# Patient Record
Sex: FEMALE | Race: WHITE | NOT HISPANIC OR LATINO
[De-identification: names, ages, dates, MRNs, and addresses within clinical notes are randomized per-mention and may not be internally consistent; named-entity substitution may affect disease eponyms.]

---

## 2020-12-15 ENCOUNTER — TRANSCRIPTION ENCOUNTER (OUTPATIENT)
Age: 25
End: 2020-12-15

## 2020-12-15 ENCOUNTER — OUTPATIENT (OUTPATIENT)
Dept: OUTPATIENT SERVICES | Facility: HOSPITAL | Age: 25
LOS: 1 days | End: 2020-12-15
Payer: COMMERCIAL

## 2020-12-15 DIAGNOSIS — Z20.828 CONTACT WITH AND (SUSPECTED) EXPOSURE TO OTHER VIRAL COMMUNICABLE DISEASES: ICD-10-CM

## 2020-12-15 LAB — SARS-COV-2 RNA SPEC QL NAA+PROBE: SIGNIFICANT CHANGE UP

## 2020-12-15 PROCEDURE — U0003: CPT

## 2020-12-16 DIAGNOSIS — Z20.828 CONTACT WITH AND (SUSPECTED) EXPOSURE TO OTHER VIRAL COMMUNICABLE DISEASES: ICD-10-CM

## 2022-08-23 ENCOUNTER — APPOINTMENT (OUTPATIENT)
Dept: PSYCHIATRY | Facility: CLINIC | Age: 27
End: 2022-08-23

## 2022-08-23 DIAGNOSIS — F32.A DEPRESSION, UNSPECIFIED: ICD-10-CM

## 2022-08-23 PROCEDURE — 99205 OFFICE O/P NEW HI 60 MIN: CPT

## 2022-09-21 ENCOUNTER — APPOINTMENT (OUTPATIENT)
Dept: PSYCHIATRY | Facility: CLINIC | Age: 27
End: 2022-09-21

## 2022-09-21 PROCEDURE — 99214 OFFICE O/P EST MOD 30 MIN: CPT | Mod: 95

## 2022-10-19 ENCOUNTER — APPOINTMENT (OUTPATIENT)
Dept: PSYCHIATRY | Facility: CLINIC | Age: 27
End: 2022-10-19

## 2022-10-19 PROCEDURE — 99214 OFFICE O/P EST MOD 30 MIN: CPT | Mod: 95

## 2022-10-19 RX ORDER — ESCITALOPRAM OXALATE 5 MG/1
5 TABLET ORAL DAILY
Qty: 30 | Refills: 0 | Status: COMPLETED | COMMUNITY
Start: 2022-09-21 | End: 2022-10-19
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
## 2022-10-19 NOTE — CURRENT PSYCHIATRIC SYMPTOMS
[Depressed Mood] : depressed mood [Anhedonia] : anhedonia [Guilt] : feeling guilty [Decreased Concentration] : decreased concentrating ability [Insomnia] : insomnia [Excessive Worry] : excessive worry [Ruminations] : ruminations [Re-experiencing] : re-experiencing [Hypochondriasis] : hypochondriasis [Panic] : panic  [de-identified] : denies [de-identified] : denies [de-identified] : denies [de-identified] : denies [de-identified] : denies

## 2022-10-19 NOTE — PAST MEDICAL HISTORY
[FreeTextEntry1] : H/O: depression and anxiety\par Inpatient hospitalization: denies \par Past SI: denies\par Therapist: [facility]. Started May 31, 2022. Didn't go for 3 weeks. \par Psychiatrist: denies\par Medication trials: denies\par Current medications: Lexapro 10 mg since June 30, 2022\par Firearms: denies \par Medical: denies\par
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
## 2022-10-19 NOTE — DISCUSSION/SUMMARY
[FreeTextEntry1] : Assessment: Patient is a 27 yo female with h/o depression and anxiety seen today for medication management. Patient is compliant with his medications, tolerating it well without any side effects. I-STOP was checked without any problems.\par \par PLAN:\par Continue Wellbutrin 75 mg PO QAM for depression, low motivation, energy, concentration and decrease SSRI induced sexual dysfunction.\par Increase Lexapro 15 to 20 mg PO QAM for depression and anxiety\par - Discussed risks and benefits of medications including side effects of GI and sexual with SSRI. Alternative strategies including no intervention discussed with patient. Patient consents to current medications as prescribed.\par - Discussed with patient regarding importance of abstinence and sobriety from alcohol and drugs. Educated about relationship between worsening mood/anxiety symptoms and drug use and improvement of symptoms with abstinence. \par - Discussed about unpredictable effects including cardiorespiratory collapse from the combination of illicit drugs and prescribed medications. Patient verbalized understanding.\par - Patient understands to contact clinic prn with concerns and agrees to call 911 or go to nearest ER if symptoms worsen.\par - Next appointment made in 1 month. Patient left the office without any distress.\par \par \par \par

## 2022-10-19 NOTE — PHYSICAL EXAM
[None] : none [Anxious] : anxious [Dysphoric] : dysphoric [Normal] : alert, oriented to person, place, and time [Fair] : fair [FreeTextEntry8] : "I'm depressed."

## 2022-11-30 ENCOUNTER — APPOINTMENT (OUTPATIENT)
Dept: PSYCHIATRY | Facility: CLINIC | Age: 27
End: 2022-11-30

## 2022-11-30 PROCEDURE — 99214 OFFICE O/P EST MOD 30 MIN: CPT | Mod: 95

## 2022-11-30 RX ORDER — BUPROPION HYDROCHLORIDE 75 MG/1
75 TABLET, FILM COATED ORAL
Qty: 30 | Refills: 0 | Status: COMPLETED | COMMUNITY
Start: 2022-08-23 | End: 2022-11-30

## 2022-11-30 NOTE — DISCUSSION/SUMMARY
[FreeTextEntry1] : Assessment: Patient is a 25 yo female with h/o depression and anxiety seen today for medication management. Patient is compliant with his medications, tolerating it well without any side effects. I-STOP was checked without any problems.\par \par PLAN:\par Increase Wellbutrin 75 to  mg PO QAM for depression, low motivation, energy, concentration and decrease SSRI induced sexual dysfunction.\par Continue Lexapro 20 mg PO QAM for depression and anxiety\par - Discussed risks and benefits of medications including side effects of GI and sexual with SSRI. Alternative strategies including no intervention discussed with patient. Patient consents to current medications as prescribed.\par - Discussed with patient regarding importance of abstinence and sobriety from alcohol and drugs. Educated about relationship between worsening mood/anxiety symptoms and drug use and improvement of symptoms with abstinence. \par - Discussed about unpredictable effects including cardiorespiratory collapse from the combination of illicit drugs and prescribed medications. Patient verbalized understanding.\par - Patient understands to contact clinic prn with concerns and agrees to call 911 or go to nearest ER if symptoms worsen.\par - Next appointment made in 1 month. Patient left the office without any distress.\par \par \par \par
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
## 2022-11-30 NOTE — PHYSICAL EXAM
[None] : none [Anxious] : anxious [Dysphoric] : dysphoric [Normal] : alert, oriented to person, place, and time [Fair] : fair [FreeTextEntry8] : "I'm depressed." better [FreeTextEntry9] : better
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
## 2022-12-30 ENCOUNTER — APPOINTMENT (OUTPATIENT)
Dept: PSYCHIATRY | Facility: CLINIC | Age: 27
End: 2022-12-30
Payer: COMMERCIAL

## 2022-12-30 PROCEDURE — 99214 OFFICE O/P EST MOD 30 MIN: CPT | Mod: 95

## 2022-12-30 NOTE — DISCUSSION/SUMMARY
[FreeTextEntry1] : Assessment: Patient is a 27 yo female with h/o depression and anxiety seen today for medication management. Patient is compliant with his medications, tolerating it well without any side effects. I-STOP was checked without any problems.\par \par PLAN:\par Increase Wellbutrin  to 300 mg PO QAM for depression, low motivation, energy, concentration and decrease SSRI induced sexual dysfunction.\par Continue Lexapro 20 mg PO QAM for depression and anxiety\par - Discussed risks and benefits of medications including side effects of GI and sexual with SSRI. Alternative strategies including no intervention discussed with patient. Patient consents to current medications as prescribed.\par - Discussed with patient regarding importance of abstinence and sobriety from alcohol and drugs. Educated about relationship between worsening mood/anxiety symptoms and drug use and improvement of symptoms with abstinence. \par - Discussed about unpredictable effects including cardiorespiratory collapse from the combination of illicit drugs and prescribed medications. Patient verbalized understanding.\par - Patient understands to contact clinic prn with concerns and agrees to call 911 or go to nearest ER if symptoms worsen.\par - Next appointment made in 1 month. Patient left the office without any distress.\par \par \par \par

## 2022-12-30 NOTE — HISTORY OF PRESENT ILLNESS
[FreeTextEntry1] : Last month Wellbutrin 75 was increased to  mg. States she likes the medication. Concentration is better. Has trouble getting out of bed but states she is working with her therapist. Lisbet Lyles ([email or web address]) Phone number is . States seeing and talking to her  has been helping her a lot. \par \par Mood: more calmer, less depression. Less irritability but still there. \par Sleep: 6-8 hours of sleep. Takes Melatonin PRN \par Appetite: lesser. "I don’t have to eat as much at lunch now."\par Energy is decreased. Trouble getting out of bed\par Concentration better \par Motivation is decreased. Denies any AVH, SI or HI. \par   [Verbal consent obtained from patient] : the patient, [unfilled]

## 2022-12-30 NOTE — CURRENT PSYCHIATRIC SYMPTOMS
[Depressed Mood] : depressed mood [Anhedonia] : anhedonia [Guilt] : feeling guilty [Decreased Concentration] : decreased concentrating ability [Insomnia] : insomnia [Excessive Worry] : excessive worry [Ruminations] : ruminations [Re-experiencing] : re-experiencing [Hypochondriasis] : hypochondriasis [Panic] : panic  [de-identified] : denies [de-identified] : denies [de-identified] : denies [de-identified] : denies [de-identified] : denies
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
## 2023-01-30 ENCOUNTER — APPOINTMENT (OUTPATIENT)
Dept: PSYCHIATRY | Facility: CLINIC | Age: 28
End: 2023-01-30
Payer: COMMERCIAL

## 2023-01-30 PROCEDURE — 99214 OFFICE O/P EST MOD 30 MIN: CPT | Mod: 95

## 2023-01-30 NOTE — DISCUSSION/SUMMARY
[FreeTextEntry1] : Assessment: Patient is a 25 yo female with h/o depression and anxiety seen today for medication management. Patient is compliant with his medications, tolerating it well without any side effects. I-STOP was checked without any problems.\par \par PLAN:\par Continue Wellbutrin  mg PO QAM for depression, low motivation, energy, concentration and decrease SSRI induced sexual dysfunction.\par Continue Lexapro 20 mg PO QAM for depression and anxiety\par - Discussed risks and benefits of medications including side effects of GI and sexual with SSRI. Alternative strategies including no intervention discussed with patient. Patient consents to current medications as prescribed.\par - Patient understands to contact clinic prn with concerns and agrees to call 911 or go to nearest ER if symptoms worsen.\par - Next appointment made in 3 month. Patient left the office without any distress.\par \par \par \par

## 2023-01-30 NOTE — HISTORY OF PRESENT ILLNESS
[FreeTextEntry1] : Last month Wellbutrin 150 was increased to 300 mg. States the first 2 weeks of the medications was good. She felt uplifting, less depression, more energy. 2 weeks ago a friend of hers passed away by suicide. States she didn't know him that well but she was in shock. Took 5.5 days off from work. \par Mood is depressed but situational. Feeling her emotions. Talking to her therapist which helps. \par Sleeping is increased which is also situational. \par Appetite is good\par Energy, concentration and motivation are al decreased which is situational. \par Therapist. Lisbet Lyles ([email or web address]) Phone number is .   [Verbal consent obtained from patient] : the patient, [unfilled]
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
## 2023-03-07 NOTE — DISCUSSION/SUMMARY
[FreeTextEntry1] : Spoke to the patient and heard from her therapist. States patient is not able to focus, has low motivation, low energy, depressed. Denies any SI. Compliant with the Lexapro 20 and Wellbutrin 300 mg. \par \par As per therpiast:\par "Patient is currently experiencing depressive symptoms for the past 9 days—including low motivation, lack of sleep/oversleeping, lack of energy, low appetite, confusion/inability to concentrate, poor memory, lethargy, and irritability. Tamanna explained that these 9 days of feeling down happened right after a week of feeling productive. We are concerned that perhaps the increased dose of Wellbutrin at the end of January might have triggered an episode, and wanted to see if we could set up an appointment for her to speak with you ASAP to further discuss this. Please let me know if there is anything that I can do to assist in this process. Thank you!"\par \par PLAN\par Lowered the dose of the Wellbutrin from 300 to  mg. \par Will try Abilify or Seroquel for depression augmentation/mood disorder\par Next appt to see writer is on [phone]. \par \par

## 2023-03-17 ENCOUNTER — APPOINTMENT (OUTPATIENT)
Dept: PSYCHIATRY | Facility: CLINIC | Age: 28
End: 2023-03-17
Payer: COMMERCIAL

## 2023-03-17 ENCOUNTER — APPOINTMENT (OUTPATIENT)
Dept: PSYCHIATRY | Facility: CLINIC | Age: 28
End: 2023-03-17

## 2023-03-17 PROCEDURE — 99214 OFFICE O/P EST MOD 30 MIN: CPT | Mod: 95

## 2023-03-17 NOTE — DISCUSSION/SUMMARY
[FreeTextEntry1] : Assessment: Patient is a 25 yo female with h/o depression and anxiety seen today for medication management. Patient is compliant with his medications, tolerating it well without any side effects. I-STOP was checked without any problems.\par \par PLAN:\par Start Seroquel 25 mg PO QHS for depression augmentation, and insomnia\par Decrease Wellbutrin  to 150 mg PO QAM for depression, low motivation, energy, concentration and decrease SSRI induced sexual dysfunction.\par Continue Lexapro 20 mg PO QAM for depression and anxiety\par - Discussed risks and benefits of medications including side effects of GI and sexual with SSRI. Alternative strategies including no intervention discussed with patient. Patient consents to current medications as prescribed.\par - Patient understands to contact clinic prn with concerns and agrees to call 911 or go to nearest ER if symptoms worsen.\par - Next appointment made in 1 month. Patient left the office without any distress.\par \par \par \par

## 2023-03-17 NOTE — HISTORY OF PRESENT ILLNESS
[FreeTextEntry1] : Patient is here for 1 month followup visit via telehealth\par \par On 3/7 spoke to the patient and heard from her therapist. States patient is not able to focus, has low motivation, low energy, depressed. Denies any SI. Compliant with the Lexapro 20 and Wellbutrin 300 mg. \par \par As per therapist:\par "Patient is currently experiencing depressive symptoms for the past 9 days—including low motivation, lack of sleep/oversleeping, lack of energy, low appetite, confusion/inability to concentrate, poor memory, lethargy, and irritability. Tamanna explained that these 9 days of feeling down happened right after a week of feeling productive. We are concerned that perhaps the increased dose of Wellbutrin at the end of January might have triggered an episode, and wanted to see if we could set up an appointment for her to speak with you ASAP to further discuss this. Please let me know if there is anything that I can do to assist in this process. Thank you!"\par \par PLAN\par Lowered the dose of the Wellbutrin from 300 to  mg. \par Will try Abilify or Seroquel for depression augmentation/mood disorder\par Next appt to see writer is on [phone]. \par \par \par Today patient is on Day 9 of Wellbutrin  mg. \par \par Mood: States she still has depression. States she has this hangover like feeling from the morning till the late afternoon. Has headache, ad decrease focus. States she si reducing her job from FT to PT and states will need a note from the writer for that but states she will tell me when. \par Sleep: fluctuates. Sun-Tuesday increase sleep Last couple of nights she had decreased sleep. \par Appetite is low eating 1/3 meals per day\par Energy, concentration and motivation are all decreased. \par Therapist. Lisbet Lyles ([email or web address]) Phone number is .   [Verbal consent obtained from patient] : the patient, [unfilled]
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
## 2023-04-17 ENCOUNTER — APPOINTMENT (OUTPATIENT)
Dept: PSYCHIATRY | Facility: CLINIC | Age: 28
End: 2023-04-17
Payer: COMMERCIAL

## 2023-04-17 PROCEDURE — 99214 OFFICE O/P EST MOD 30 MIN: CPT | Mod: 95

## 2023-04-17 NOTE — HISTORY OF PRESENT ILLNESS
[FreeTextEntry1] : Patient is here for 1 month followup visit via telehealth\par \par Last month Seroquel 25 mg on the patient. States she likes the medication. States she does feel groggy in the morning but likes the fact she si sleeping so great on it. Last month also Wellbutrin was decreased from  to  mg as it was affecting her sleep. \par \par Mood: better. Less depression. Went back to work FT. \par Sleep: 7-8 hours\par Appetite better\par Energy, concentration and motivation are all better. \par Therapist. Lisbet Lyles ([email or web address]) Phone number is .   [Verbal consent obtained from patient] : the patient, [unfilled]
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
## 2023-04-17 NOTE — DISCUSSION/SUMMARY
[FreeTextEntry1] : Assessment: Patient is a 27 yo female with h/o depression and anxiety seen today for medication management. Patient is compliant with his medications, tolerating it well without any side effects. I-STOP was checked without any problems.\par \par PLAN:\par Continue Seroquel 25 mg PO QHS for depression augmentation, and insomnia\par Continue Wellbutrin  mg PO QAM for depression, low motivation, energy, concentration and decrease SSRI induced sexual dysfunction.\par Continue Lexapro 20 mg PO QAM for depression and anxiety\par - Discussed risks and benefits of medications including side effects of GI and sexual with SSRI. Alternative strategies including no intervention discussed with patient. Patient consents to current medications as prescribed.\par - Patient understands to contact clinic prn with concerns and agrees to call 911 or go to nearest ER if symptoms worsen.\par - Next appointment made in 1 month. Patient left the office without any distress.\par \par \par \par

## 2023-05-25 ENCOUNTER — APPOINTMENT (OUTPATIENT)
Dept: PSYCHIATRY | Facility: CLINIC | Age: 28
End: 2023-05-25
Payer: COMMERCIAL

## 2023-05-25 PROCEDURE — 99214 OFFICE O/P EST MOD 30 MIN: CPT | Mod: 95
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
## 2023-05-25 NOTE — DISCUSSION/SUMMARY
[FreeTextEntry1] : Assessment: Patient is a 27 yo female with h/o depression and anxiety seen today for medication management. Patient is compliant with his medications, tolerating it well without any side effects. I-STOP was checked without any problems.\par \par PLAN:\par Continue Seroquel 25 mg PO QHS for depression augmentation, and insomnia\par Continue Wellbutrin  mg PO QAM for depression, low motivation, energy, concentration and decrease SSRI induced sexual dysfunction.\par Continue Lexapro 20 mg PO QAM for depression and anxiety\par - Discussed risks and benefits of medications including side effects of GI and sexual with SSRI. Alternative strategies including no intervention discussed with patient. Patient consents to current medications as prescribed.\par - Patient understands to contact clinic prn with concerns and agrees to call 911 or go to nearest ER if symptoms worsen.\par - Next appointment made in 3 month. Patient left the office without any distress.\par \par \par \par 
Patent

## 2023-05-25 NOTE — PHYSICAL EXAM
[None] : none [Anxious] : anxious [Dysphoric] : dysphoric [Normal] : alert, oriented to person, place, and time [Fair] : fair [FreeTextEntry9] : better [FreeTextEntry8] : "I'm depressed." better

## 2023-08-15 ENCOUNTER — APPOINTMENT (OUTPATIENT)
Dept: PSYCHIATRY | Facility: CLINIC | Age: 28
End: 2023-08-15
Payer: SELF-PAY

## 2023-08-15 PROCEDURE — 99214 OFFICE O/P EST MOD 30 MIN: CPT | Mod: 95

## 2023-08-15 RX ORDER — BUPROPION HYDROCHLORIDE 150 MG/1
150 TABLET, EXTENDED RELEASE ORAL
Qty: 90 | Refills: 1 | Status: ACTIVE | COMMUNITY
Start: 2022-11-30 | End: 1900-01-01

## 2023-08-15 RX ORDER — ESCITALOPRAM OXALATE 20 MG/1
20 TABLET ORAL DAILY
Qty: 90 | Refills: 1 | Status: ACTIVE | COMMUNITY
Start: 2022-08-23 | End: 1900-01-01
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
## 2023-08-15 NOTE — DISCUSSION/SUMMARY
[FreeTextEntry1] : Assessment: Patient is a 27 yo female with h/o depression and anxiety seen today for medication management. Patient is compliant with his medications, tolerating it well without any side effects. I-STOP was checked without any problems.  PLAN: Continue Seroquel 25 mg PO QHS for depression augmentation, and insomnia Continue Wellbutrin  mg PO QAM for depression, low motivation, energy, concentration and decrease SSRI induced sexual dysfunction. Continue Lexapro 20 mg PO QAM for depression and anxiety - Discussed risks and benefits of medications including side effects of GI and sexual with SSRI. Alternative strategies including no intervention discussed with patient. Patient consents to current medications as prescribed. - Patient understands to contact clinic prn with concerns and agrees to call 911 or go to nearest ER if symptoms worsen. - Next appointment made in 6 month. Patient left the office without any distress.

## 2023-08-15 NOTE — HISTORY OF PRESENT ILLNESS
[FreeTextEntry1] : The following service was provided using telehealth between writer/provider and patient. The patient was at home. The writer was at clinic. Patient was alone and consented to telehealth format. All treatment plans through and including today's date were reviewed with the patient.  Patient is here for 3-month follow-up visit via telehealth.  No medication changes at that time. States she is doing very well.   Mood: better. Less depression and less anxiety. 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  Sleep: 7-8 hours. Taking Seroquel 12.5 mg Appetite better Energy, concentration and motivation are all better.  Therapist. Lisbet Lyles ([email or web address]) Phone number is .   [Verbal consent obtained from patient] : the patient, [unfilled]

## 2023-11-08 ENCOUNTER — APPOINTMENT (OUTPATIENT)
Dept: PSYCHIATRY | Facility: CLINIC | Age: 28
End: 2023-11-08
Payer: SELF-PAY

## 2023-11-08 PROCEDURE — 99214 OFFICE O/P EST MOD 30 MIN: CPT | Mod: 95

## 2023-11-08 RX ORDER — QUETIAPINE FUMARATE 25 MG/1
25 TABLET ORAL
Qty: 90 | Refills: 1 | Status: COMPLETED | COMMUNITY
Start: 2023-03-17 | End: 2023-11-08

## 2024-02-12 ENCOUNTER — APPOINTMENT (OUTPATIENT)
Dept: PSYCHIATRY | Facility: CLINIC | Age: 29
End: 2024-02-12

## 2024-10-16 ENCOUNTER — APPOINTMENT (OUTPATIENT)
Dept: PSYCHIATRY | Facility: CLINIC | Age: 29
End: 2024-10-16
Payer: SELF-PAY

## 2024-10-16 PROCEDURE — 99214 OFFICE O/P EST MOD 30 MIN: CPT | Mod: 95

## 2024-10-16 RX ORDER — BUPROPION HYDROCHLORIDE 150 MG/1
150 TABLET, FILM COATED, EXTENDED RELEASE ORAL DAILY
Qty: 90 | Refills: 0 | Status: ACTIVE | COMMUNITY
Start: 2024-10-16 | End: 1900-01-01

## 2025-01-13 ENCOUNTER — APPOINTMENT (OUTPATIENT)
Dept: PSYCHIATRY | Facility: CLINIC | Age: 30
End: 2025-01-13
Payer: SELF-PAY

## 2025-01-13 PROCEDURE — 99214 OFFICE O/P EST MOD 30 MIN: CPT | Mod: 95

## 2025-04-14 ENCOUNTER — APPOINTMENT (OUTPATIENT)
Dept: PSYCHIATRY | Facility: CLINIC | Age: 30
End: 2025-04-14
Payer: SELF-PAY

## 2025-04-14 PROCEDURE — 99214 OFFICE O/P EST MOD 30 MIN: CPT | Mod: 95

## 2025-05-12 ENCOUNTER — APPOINTMENT (OUTPATIENT)
Dept: PSYCHIATRY | Facility: CLINIC | Age: 30
End: 2025-05-12